# Patient Record
Sex: FEMALE | Race: OTHER | Employment: UNEMPLOYED | URBAN - METROPOLITAN AREA
[De-identification: names, ages, dates, MRNs, and addresses within clinical notes are randomized per-mention and may not be internally consistent; named-entity substitution may affect disease eponyms.]

---

## 2020-07-14 ENCOUNTER — HOSPITAL ENCOUNTER (EMERGENCY)
Age: 35
Discharge: LEFT BEFORE DISCHARGE | End: 2020-07-14

## 2020-07-14 VITALS — TEMPERATURE: 98 F

## 2020-07-14 NOTE — ED NOTES
Pt continues to ask other patients for ride home. Security called. Talked to pt on  phone. Pt declined to be seen by MD.  Alcohol smell on pts breathe. Educated pt that [facility] could not supply a Lyft home, due to, alcohol smell on breathe. Pt was going to call someone to come pick her up.        Lucy Castro RN  07/14/20 [id number]